# Patient Record
Sex: MALE | Race: WHITE | Employment: OTHER | URBAN - METROPOLITAN AREA
[De-identification: names, ages, dates, MRNs, and addresses within clinical notes are randomized per-mention and may not be internally consistent; named-entity substitution may affect disease eponyms.]

---

## 2021-08-19 ENCOUNTER — TELEPHONE (OUTPATIENT)
Dept: NEUROSURGERY | Facility: CLINIC | Age: 56
End: 2021-08-19

## 2021-08-19 NOTE — TELEPHONE ENCOUNTER
Message left on patients phone that we have requested the image from [facility] for the MRI [facility] from 7/13/21 and the report has been faxed to us as well

## 2021-08-19 NOTE — TELEPHONE ENCOUNTER
[facility] Call Center    Phone Message    May a detailed message be left on voicemail: yes     Reason for Call: Patient calling regarding wondering if the care team has gotten the MRI results that were done at [facility] in [place]. Please advise. Thank you    Action Taken: Message routed to:  Adult Clinics: Neurology p [id number]    Travel Screening: Not Applicable

## 2021-08-22 ENCOUNTER — HEALTH MAINTENANCE LETTER (OUTPATIENT)
Age: 56
End: 2021-08-22

## 2021-08-24 NOTE — TELEPHONE ENCOUNTER
Pt is wondering if we received the MRI yet or not.  Please call him back to let him know.  Thanks.

## 2021-08-27 ENCOUNTER — OFFICE VISIT (OUTPATIENT)
Dept: NEUROSURGERY | Facility: CLINIC | Age: 56
End: 2021-08-27
Payer: COMMERCIAL

## 2021-08-27 VITALS — HEART RATE: 73 BPM | DIASTOLIC BLOOD PRESSURE: 90 MMHG | RESPIRATION RATE: 20 BRPM | SYSTOLIC BLOOD PRESSURE: 126 MMHG

## 2021-08-27 DIAGNOSIS — M54.16 LUMBAR RADICULOPATHY: Primary | ICD-10-CM

## 2021-08-27 PROCEDURE — 99204 OFFICE O/P NEW MOD 45 MIN: CPT | Performed by: NEUROLOGICAL SURGERY

## 2021-08-27 RX ORDER — PAROXETINE 30 MG/1
30 TABLET, FILM COATED ORAL
COMMUNITY
Start: 2021-07-21

## 2021-08-27 RX ORDER — ALPRAZOLAM 0.5 MG
TABLET ORAL
COMMUNITY
Start: 2021-02-09

## 2021-08-27 RX ORDER — OXYCODONE HYDROCHLORIDE 5 MG/1
TABLET ORAL
COMMUNITY
Start: 2020-08-28

## 2021-08-27 RX ORDER — MELOXICAM 15 MG/1
TABLET ORAL
COMMUNITY
Start: 2020-10-08

## 2021-08-27 RX ORDER — MIRTAZAPINE 45 MG/1
45 TABLET, FILM COATED ORAL
COMMUNITY
Start: 2021-07-21

## 2021-08-27 RX ORDER — TAMSULOSIN HYDROCHLORIDE 0.4 MG/1
0.8 CAPSULE ORAL
COMMUNITY
Start: 2020-07-15 | End: 2021-12-18

## 2021-08-27 RX ORDER — CYCLOBENZAPRINE HCL 10 MG
TABLET ORAL
COMMUNITY
Start: 2020-12-10

## 2021-08-27 RX ORDER — METOPROLOL TARTRATE 100 MG
100 TABLET ORAL
COMMUNITY
Start: 2020-07-15

## 2021-08-27 RX ORDER — ONDANSETRON 4 MG/1
4 TABLET, ORALLY DISINTEGRATING ORAL
COMMUNITY
Start: 2020-12-14

## 2021-08-27 NOTE — PROGRESS NOTES
Mr. Faustin is a 56-year-old man seen today for second opinion regarding intractable right lower extremity pain.  He has a long and complicated history of prior lumbar surgeries.  He has undergone multilevel lumbar decompression decades ago, a left L4-5 decompression at [facility] complicated by dural tear and persistent pain as well as a more recent left L4-5 foraminotomies which I performed in 2017 with good result.  His current complaint is intermittent intractable right leg pain which he describes as electrical in nature associated with standing or walking or changing position with lumbar extension.  He reports that this pain is severe, happens several times a day and is markedly limiting.  Presently, he has minimal complaint of mechanical low back pain and denies any symptoms into his left leg.  He was recently seen by Dr. Salazar at [facility] and the possibility of a repeat multilevel lumbar decompression was discussed.  Dr. Salazar obtained a lumbar MRI scan and subsequently a lumbar CT scan and evaluating this problem.  Those studies are reviewed today as well.    On examination, he is able to arise from a seated to a standing position with minimal pain behavior.  Gait appears to be normal with a slight antalgic component toward the right side.  Motor examination of his bilateral lower extremity shows 5-/5 strength with plantar flexion while weightbearing at the ankles bilaterally.  Ankle dorsiflexing while weightbearing was 5/5.  Proximal bilateral lower extremity strength appears to be intact.  There is no evidence of dermatomal sensory disturbance although there is evidence of peripheral neuropathy.  Deep tendon reflex testing is 1+ at the knees bilaterally, trace at the ankles bilaterally.  Examination of his back demonstrates multiple vertical surgical scars which appear to be well-healed.  He has no other findings with direct palpation or percussion of his low back.    Review of his recent  lumbar MRI scan shows significant lumbar spondylitic change throughout.  The most significant abnormalities are noted at L3-4 where there is a midline decompression noted but substantial lateral recess stenosis present bilaterally.  Spinal canal appears to be adequately decompressed at L4-5 and L5-S1.  He has near complete collapse of the disc space at L4-5 particularly toward the right side with severe right L4-5 foraminal stenosis and visible compression of the horizontal portion of the exiting right L4 nerve root.  Foramen at adjacent levels on the left side otherwise appear adequate.    Assessment: Probable intractable right L4 radiculopathy related to collapse of the right L4-5 disc space with high-grade foraminal stenosis and visible compression of the exiting right L4 nerve root on MRI testing with a recent lumbar CT scan documenting significant spur formation and bony stenosis of the right L4-5 foramen as well.  I have reviewed the clinical and radiographic findings in detail with the patient and his wife who was also present throughout the course of the interview and examination.  He has recently failed what sounds like a selective foraminal injection at L4-5 with only transient symptomatic improvement subsequent to that injection.  I have discussed the possibility of a right L4-5 transforaminal lumbar interbody fusion to affect both direct and indirect decompression of the foramen with stabilization of the segment and the use of bilateral transcutaneous pedicular screws.  I spoke with the patient and his wife at some length regarding the differential diagnosis for this problem as well as this recommendation for surgical intervention.  I do not believe that the right L4-5 neural foramen would be amenable to direct decompression given the presence of substantial bony stenosis punctuated by a ventral osteophyte impinging the exiting right L4 nerve root.  I would recommend against repeated attempt at  multilevel canal decompression as I do not believe that this is the primary  of his current disability or pain complaint as discussed above.  I spoke with the patient at length regarding the potential risks of the proposed transforaminal lumbar interbody fusion and decompression.  I told him that the risks of the procedure include failure to improve his symptoms, increased pain weakness or numbness, injury to the nerve root or dural covering, infection, bleeding, several spinal fluid leakage, misplacement or the need for replacement or revision of the instrumentation or interbody arthrodesis, failure of fusion, adjacent level disease, etc.  He appeared to understand the discussion, asked appropriate questions which I answered and wished to consider matter further and consult with Dr. Salazar once again prior to making a decision.    I told him that there is evidence of lateral recess stenosis at L3-4 but given the current nature and severity of his symptoms as well as the lateralization of the symptoms I would not recommend decompression at the L3-4 level although that may be necessary at some point in the future.    More than 40 minutes was spent direct contact with the patient or reviewing clinical and radiographic records, discussing differential diagnosis, management alternatives, risks and benefits.

## 2021-08-27 NOTE — LETTER
8/27/2021         RE: Lasha Faustin  [address] [id number]        Dear Colleague,    Thank you for referring your patient, Lasha Faustin, to the [facility] [place]. Please see a copy of my visit note below.    Mr. Faustin is a 56-year-old man seen today for second opinion regarding intractable right lower extremity pain.  He has a long and complicated history of prior lumbar surgeries.  He has undergone multilevel lumbar decompression decades ago, a left L4-5 decompression at [facility], [place], operated by [facility] complicated by dural tear and persistent pain as well as a more recent left L4-5 foraminotomies which I performed in 2017 with good result.  His current complaint is intermittent intractable right leg pain which he describes as electrical in nature associated with standing or walking or changing position with lumbar extension.  He reports that this pain is severe, happens several times a day and is markedly limiting.  Presently, he has minimal complaint of mechanical low back pain and denies any symptoms into his left leg.  He was recently seen by Dr. Salazar at [facility], [place], operated by [facility] and the possibility of a repeat multilevel lumbar decompression was discussed.  Dr. Salazar obtained a lumbar MRI scan and subsequently a lumbar CT scan and evaluating this problem.  Those studies are reviewed today as well.    On examination, he is able to arise from a seated to a standing position with minimal pain behavior.  Gait appears to be normal with a slight antalgic component toward the right side.  Motor examination of his bilateral lower extremity shows 5-/5 strength with plantar flexion while weightbearing at the ankles bilaterally.  Ankle dorsiflexing while weightbearing was 5/5.  Proximal bilateral lower extremity strength appears to be intact.  There is no evidence of dermatomal sensory disturbance although there is evidence of peripheral neuropathy.  Deep tendon reflex  testing is 1+ at the knees bilaterally, trace at the ankles bilaterally.  Examination of his back demonstrates multiple vertical surgical scars which appear to be well-healed.  He has no other findings with direct palpation or percussion of his low back.    Review of his recent lumbar MRI scan shows significant lumbar spondylitic change throughout.  The most significant abnormalities are noted at L3-4 where there is a midline decompression noted but substantial lateral recess stenosis present bilaterally.  Spinal canal appears to be adequately decompressed at L4-5 and L5-S1.  He has near complete collapse of the disc space at L4-5 particularly toward the right side with severe right L4-5 foraminal stenosis and visible compression of the horizontal portion of the exiting right L4 nerve root.  Foramen at adjacent levels on the left side otherwise appear adequate.    Assessment: Probable intractable right L4 radiculopathy related to collapse of the right L4-5 disc space with high-grade foraminal stenosis and visible compression of the exiting right L4 nerve root on MRI testing with a recent lumbar CT scan documenting significant spur formation and bony stenosis of the right L4-5 foramen as well.  I have reviewed the clinical and radiographic findings in detail with the patient and his wife who was also present throughout the course of the interview and examination.  He has recently failed what sounds like a selective foraminal injection at L4-5 with only transient symptomatic improvement subsequent to that injection.  I have discussed the possibility of a right L4-5 transforaminal lumbar interbody fusion to affect both direct and indirect decompression of the foramen with stabilization of the segment and the use of bilateral transcutaneous pedicular screws.  I spoke with the patient and his wife at some length regarding the differential diagnosis for this problem as well as this recommendation for surgical  intervention.  I do not believe that the right L4-5 neural foramen would be amenable to direct decompression given the presence of substantial bony stenosis punctuated by a ventral osteophyte impinging the exiting right L4 nerve root.  I would recommend against repeated attempt at multilevel canal decompression as I do not believe that this is the primary  of his current disability or pain complaint as discussed above.  I spoke with the patient at length regarding the potential risks of the proposed transforaminal lumbar interbody fusion and decompression.  I told him that the risks of the procedure include failure to improve his symptoms, increased pain weakness or numbness, injury to the nerve root or dural covering, infection, bleeding, several spinal fluid leakage, misplacement or the need for replacement or revision of the instrumentation or interbody arthrodesis, failure of fusion, adjacent level disease, etc.  He appeared to understand the discussion, asked appropriate questions which I answered and wished to consider matter further and consult with Dr. Salazar once again prior to making a decision.    I told him that there is evidence of lateral recess stenosis at L3-4 but given the current nature and severity of his symptoms as well as the lateralization of the symptoms I would not recommend decompression at the L3-4 level although that may be necessary at some point in the future.    More than 40 minutes was spent direct contact with the patient or reviewing clinical and radiographic records, discussing differential diagnosis, management alternatives, risks and benefits.      Again, thank you for allowing me to participate in the care of your patient.        Sincerely,        Nabil López MD

## 2021-10-24 ENCOUNTER — HEALTH MAINTENANCE LETTER (OUTPATIENT)
Age: 56
End: 2021-10-24

## 2022-10-15 ENCOUNTER — HEALTH MAINTENANCE LETTER (OUTPATIENT)
Age: 57
End: 2022-10-15

## 2023-10-29 ENCOUNTER — HEALTH MAINTENANCE LETTER (OUTPATIENT)
Age: 58
End: 2023-10-29